# Patient Record
Sex: MALE | Race: WHITE | NOT HISPANIC OR LATINO | URBAN - METROPOLITAN AREA
[De-identification: names, ages, dates, MRNs, and addresses within clinical notes are randomized per-mention and may not be internally consistent; named-entity substitution may affect disease eponyms.]

---

## 2018-07-31 ENCOUNTER — OUTPATIENT (OUTPATIENT)
Dept: OUTPATIENT SERVICES | Facility: HOSPITAL | Age: 83
LOS: 1 days | End: 2018-07-31

## 2020-04-29 ENCOUNTER — OUTPATIENT (OUTPATIENT)
Dept: OUTPATIENT SERVICES | Facility: HOSPITAL | Age: 85
LOS: 1 days | End: 2020-04-29

## 2020-04-29 ENCOUNTER — INPATIENT (INPATIENT)
Facility: HOSPITAL | Age: 85
LOS: 1 days | Discharge: NURSING, REHAB OR LONG-TERM CARE | End: 2020-05-01
Attending: FAMILY MEDICINE
Payer: MEDICARE

## 2020-04-29 PROCEDURE — 99284 EMERGENCY DEPT VISIT MOD MDM: CPT

## 2020-04-29 PROCEDURE — 72146 MRI CHEST SPINE W/O DYE: CPT | Mod: 26

## 2020-04-29 PROCEDURE — 99221 1ST HOSP IP/OBS SF/LOW 40: CPT

## 2020-04-29 PROCEDURE — 72148 MRI LUMBAR SPINE W/O DYE: CPT | Mod: 26

## 2020-04-30 ENCOUNTER — OUTPATIENT (OUTPATIENT)
Dept: OUTPATIENT SERVICES | Facility: HOSPITAL | Age: 85
LOS: 1 days | End: 2020-04-30

## 2020-05-01 ENCOUNTER — OUTPATIENT (OUTPATIENT)
Dept: OUTPATIENT SERVICES | Facility: HOSPITAL | Age: 85
LOS: 1 days | End: 2020-05-01

## 2020-05-01 ENCOUNTER — INPATIENT (INPATIENT)
Facility: HOSPITAL | Age: 85
LOS: 20 days | Discharge: HOME | End: 2020-05-22
Attending: INTERNAL MEDICINE | Admitting: INTERNAL MEDICINE
Payer: MEDICARE

## 2020-05-04 ENCOUNTER — OUTPATIENT (OUTPATIENT)
Dept: OUTPATIENT SERVICES | Facility: HOSPITAL | Age: 85
LOS: 1 days | End: 2020-05-04

## 2020-05-13 ENCOUNTER — OUTPATIENT (OUTPATIENT)
Dept: OUTPATIENT SERVICES | Facility: HOSPITAL | Age: 85
LOS: 1 days | End: 2020-05-13

## 2020-05-14 ENCOUNTER — OUTPATIENT (OUTPATIENT)
Dept: OUTPATIENT SERVICES | Facility: HOSPITAL | Age: 85
LOS: 1 days | End: 2020-05-14

## 2020-05-14 PROCEDURE — 71045 X-RAY EXAM CHEST 1 VIEW: CPT | Mod: 26

## 2020-05-19 ENCOUNTER — OUTPATIENT (OUTPATIENT)
Dept: OUTPATIENT SERVICES | Facility: HOSPITAL | Age: 85
LOS: 1 days | End: 2020-05-19

## 2020-05-20 ENCOUNTER — OUTPATIENT (OUTPATIENT)
Dept: OUTPATIENT SERVICES | Facility: HOSPITAL | Age: 85
LOS: 1 days | End: 2020-05-20

## 2020-05-21 ENCOUNTER — OUTPATIENT (OUTPATIENT)
Dept: OUTPATIENT SERVICES | Facility: HOSPITAL | Age: 85
LOS: 1 days | End: 2020-05-21

## 2020-05-27 PROBLEM — Z00.00 ENCOUNTER FOR PREVENTIVE HEALTH EXAMINATION: Status: ACTIVE | Noted: 2020-05-27

## 2020-06-04 ENCOUNTER — APPOINTMENT (OUTPATIENT)
Dept: NEUROSURGERY | Facility: CLINIC | Age: 85
End: 2020-06-04
Payer: MEDICARE

## 2020-06-04 VITALS
WEIGHT: 175 LBS | HEIGHT: 71 IN | SYSTOLIC BLOOD PRESSURE: 128 MMHG | DIASTOLIC BLOOD PRESSURE: 68 MMHG | HEART RATE: 70 BPM | BODY MASS INDEX: 24.5 KG/M2

## 2020-06-04 DIAGNOSIS — Z80.1 FAMILY HISTORY OF MALIGNANT NEOPLASM OF TRACHEA, BRONCHUS AND LUNG: ICD-10-CM

## 2020-06-04 DIAGNOSIS — I10 ESSENTIAL (PRIMARY) HYPERTENSION: ICD-10-CM

## 2020-06-04 PROCEDURE — 99215 OFFICE O/P EST HI 40 MIN: CPT

## 2020-06-04 RX ORDER — OXYCODONE HYDROCHLORIDE 5 MG/1
5 CAPSULE ORAL EVERY 6 HOURS
Qty: 40 | Refills: 0 | Status: ACTIVE | COMMUNITY
Start: 2020-06-04 | End: 1900-01-01

## 2020-06-04 RX ORDER — ENOXAPARIN SODIUM 40 MG/.4ML
40 INJECTION SUBCUTANEOUS
Refills: 0 | Status: ACTIVE | COMMUNITY

## 2020-06-04 RX ORDER — POLYETHYLENE GLYCOL 3350
POWDER (GRAM) MISCELLANEOUS
Refills: 0 | Status: ACTIVE | COMMUNITY

## 2020-06-04 RX ORDER — 0.9 % SODIUM CHLORIDE 0.9 %
0.9 INTRAVENOUS SOLUTION INTRAVENOUS
Refills: 0 | Status: ACTIVE | COMMUNITY

## 2020-06-04 RX ORDER — LEVOBUNOLOL HYDROCHLORIDE 5 MG/ML
0.5 SOLUTION/ DROPS OPHTHALMIC
Refills: 0 | Status: ACTIVE | COMMUNITY

## 2020-06-04 RX ORDER — LATANOPROST/PF 0.005 %
0.01 DROPS OPHTHALMIC (EYE)
Refills: 0 | Status: ACTIVE | COMMUNITY

## 2020-06-04 RX ORDER — FAMOTIDINE 20 MG/1
20 TABLET, FILM COATED ORAL
Refills: 0 | Status: ACTIVE | COMMUNITY

## 2020-06-04 RX ORDER — CELECOXIB 200 MG/1
200 CAPSULE ORAL
Qty: 30 | Refills: 2 | Status: ACTIVE | COMMUNITY
Start: 2020-06-04 | End: 1900-01-01

## 2020-06-04 RX ORDER — TRAMADOL HYDROCHLORIDE 25 MG/1
TABLET, COATED ORAL
Refills: 0 | Status: ACTIVE | COMMUNITY

## 2020-06-04 RX ORDER — OXYCODONE HYDROCHLORIDE 5 MG/1
5 CAPSULE ORAL
Refills: 0 | Status: ACTIVE | COMMUNITY

## 2020-06-04 NOTE — CONSULT LETTER
[Dear  ___] : Dear  [unfilled], [Consult Letter:] : I had the pleasure of evaluating your patient, [unfilled]. [Consult Closing:] : Thank you very much for allowing me to participate in the care of this patient.  If you have any questions, please do not hesitate to contact me. [Sincerely,] : Sincerely, [FreeTextEntry1] : \par Mr. Velasquez is a pleasant 86 year old male who presents to the office in follow-up after he was admitted to [facility] about two months ago with gait difficulty and lower extremity weakness. He has been experiencing progressive weakness and numbness over the past three months. He is unable to walk unassisted and gets around primarily with a wheelchair. He had physical therapy at [facility] without any relief. He has been also taking Tramadol and Tylenol for pain with minimal relief. An MRI performed at [facility] reveals severe central canal stenosis most notable at L2-3 and L3-4, less so at L4-5. 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 CONSERVATIVE METHODS\par \par This is a patient with severe bilateral facet arthropathy and bulging discs at L2-3, L3-4 and L4-5 resulting in severe central canal stenosis causing symptoms suggestive of neurogenic claudication. He has undergone conservative management with no relief. He is barely ambulatory at this point even with assistance and had no improvement despite a month in rehab. At this point his pain/numbness and weakness is refractory to the conservative methods and he is a good candidate for a decompressive laminectomy with possible fusion. This entails removing the lamina and possibly removing the medial facet joints along with the underlying hypertrophied ligamentum flavum.  This will allow for a widening of the spinal canal and the neuroforamen at the effected levels.  In doing so may create instability to the spine (at this level) requiring the need for instrumented stabilization and fusion.  This implies the use of pedicle screws and possibly interbody prosthetics to provide strength and anatomical alignment to the operated segments.  \par  \par Risks and benefits of surgery were described to the patient in detail which include but not excluding those otherwise not mentioned:  coma paralysis, death, stroke, spinal fluid leak, nerve injury, weakness, infection, hardware malfunction/failure/malpositioning requiring re-operation, vascular injury, nonunion/pseudoarthrosis, adjacent segment degeneration, dysphonia/dysphagia and persistent pain. [FreeTextEntry3] : Akanksha Orlando [employer]

## 2020-06-04 NOTE — RESULTS/DATA
[FreeTextEntry1] : MRI ([facility] [place]) l-spine was personally reviewed with the patient in the office today. At L2-3, L3-4 there is a broad based disc bulge with severe bilateral facet arthropathy resulting in severe central canal stenosis. At L4-5 there is a posterior disc bulge and facet arthropathy with ligamentum flavum hypertrophy resulting in moderate to severe central canal stenosis. There is severe left and moderate right foraminal stenosis at that level.

## 2020-06-04 NOTE — PHYSICAL EXAM
[General Appearance - Alert] : alert [General Appearance - Well Developed] : well developed [General Appearance - Well Nourished] : well nourished [General Appearance - In No Acute Distress] : in no acute distress [] : normal voice and communication [General Appearance - Well-Appearing] : healthy appearing [Place] : oriented to place [Person] : oriented to person [Time] : oriented to time [Involuntary Movements] : no involuntary movements were seen [Motor Tone] : muscle tone was normal in all four extremities [No Muscle Atrophy] : normal bulk in all four extremities [Motor Handedness Right-Handed] : the patient is right hand dominant [Limited Balance] : the patient's balance was impaired [2+] : Patella right 2+ [1+] : Patella left 1+ [Antalgic] : antalgic [4] : 4/5 Great Toe Extension (L5) [5] : 5/5 Ankle Plantar Flexion (S1) [Intact] : all sensory within normal limits bilaterally [FreeTextEntry1] : minimally ambulatory [Able to toe walk] : the patient was not able to toe walk [Able to heel walk] : the patient was not able to heel walk

## 2020-06-04 NOTE — HISTORY OF PRESENT ILLNESS
[FreeTextEntry1] : Mr. Velasquez is a pleasant 86 year old male who presents to the office in follow-up after he was admitted to [facility] [place] about two months ago with gait difficulty and lower extremity weakness. He has been experiencing progressive weakness and numbness over the past three months. He is unable to walk unassisted and gets around primarily with a wheelchair. He had physical therapy at [facility] [place] [facility] without any relief. He has been also taking Tramadol and Tylenol for pain with minimal relief. An MRI performed at [facility] [place] [facility] reveals severe central canal stenosis most notable at L2-3 and L3-4, less so at L4-5.

## 2020-06-04 NOTE — REASON FOR VISIT
[Follow-Up: _____] : a [unfilled] follow-up visit [Spouse] : spouse [FreeTextEntry1] : Lower back pain with radiating leg pain. Patient had imaging done at [facility].

## 2020-06-04 NOTE — REVIEW OF SYSTEMS
[Numbness] : numbness [Tingling] : tingling [Abnormal Sensation] : an abnormal sensation [Negative] : Constitutional

## 2020-06-04 NOTE — ASSESSMENT
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 CONSERVATIVE METHODS\par \par This is a patient with severe bilateral facet arthropathy and bulging discs at L2-3, L3-4 and L4-5 resulting in severe central canal stenosis causing symptoms suggestive of neurogenic claudication. He has undergone conservative management with no relief. He is barely ambulatory at this point even with assistance and had no improvement despite a month in rehab. At this point his pain/numbness and weakness is refractory to the conservative methods and he is a good candidate for a decompressive laminectomy with possible fusion. This entails removing the lamina and possibly removing the medial facet joints along with the underlying hypertrophied ligamentum flavum.  This will allow for a widening of the spinal canal and the neuroforamen at the effected levels.  In doing so may create instability to the spine (at this level) requiring the need for instrumented stabilization and fusion.  This implies the use of pedicle screws and possibly interbody prosthetics to provide strength and anatomical alignment to the operated segments.  \par  \par Risks and benefits of surgery were described to the patient in detail which include but not excluding those otherwise not mentioned:  coma paralysis, death, stroke, spinal fluid leak, nerve injury, weakness, infection, hardware malfunction/failure/malpositioning requiring re-operation, vascular injury, nonunion/pseudoarthrosis, adjacent segment degeneration, dysphonia/dysphagia and persistent pain.

## 2020-06-08 ENCOUNTER — INPATIENT (INPATIENT)
Facility: HOSPITAL | Age: 85
LOS: 3 days | Discharge: NURSING, REHAB OR LONG-TERM CARE | End: 2020-06-12
Attending: NEUROLOGICAL SURGERY | Admitting: NEUROLOGICAL SURGERY
Payer: MEDICARE

## 2020-06-08 ENCOUNTER — OUTPATIENT (OUTPATIENT)
Dept: OUTPATIENT SERVICES | Facility: HOSPITAL | Age: 85
LOS: 1 days | End: 2020-06-08

## 2020-06-08 PROCEDURE — 99285 EMERGENCY DEPT VISIT HI MDM: CPT

## 2020-06-08 PROCEDURE — 71045 X-RAY EXAM CHEST 1 VIEW: CPT | Mod: 26

## 2020-06-10 ENCOUNTER — OUTPATIENT (OUTPATIENT)
Dept: OUTPATIENT SERVICES | Facility: HOSPITAL | Age: 85
LOS: 1 days | End: 2020-06-10

## 2020-06-10 PROCEDURE — 63047 LAM FACETEC & FORAMOT LUMBAR: CPT

## 2020-06-10 PROCEDURE — 63047 LAM FACETEC & FORAMOT LUMBAR: CPT | Mod: AS

## 2020-06-10 PROCEDURE — 63048 LAM FACETEC &FORAMOT EA ADDL: CPT

## 2020-06-10 PROCEDURE — 63048 LAM FACETEC &FORAMOT EA ADDL: CPT | Mod: AS

## 2020-06-11 ENCOUNTER — OUTPATIENT (OUTPATIENT)
Dept: OUTPATIENT SERVICES | Facility: HOSPITAL | Age: 85
LOS: 1 days | End: 2020-06-11

## 2020-06-12 ENCOUNTER — OUTPATIENT (OUTPATIENT)
Dept: OUTPATIENT SERVICES | Facility: HOSPITAL | Age: 85
LOS: 1 days | End: 2020-06-12

## 2020-06-12 ENCOUNTER — INPATIENT (INPATIENT)
Facility: HOSPITAL | Age: 85
LOS: 20 days | Discharge: HOME | End: 2020-07-03
Attending: INTERNAL MEDICINE | Admitting: INTERNAL MEDICINE

## 2020-06-16 ENCOUNTER — OUTPATIENT (OUTPATIENT)
Dept: OUTPATIENT SERVICES | Facility: HOSPITAL | Age: 85
LOS: 1 days | End: 2020-06-16

## 2020-06-22 ENCOUNTER — OUTPATIENT (OUTPATIENT)
Dept: OUTPATIENT SERVICES | Facility: HOSPITAL | Age: 85
LOS: 1 days | End: 2020-06-22

## 2020-07-02 ENCOUNTER — OUTPATIENT (OUTPATIENT)
Dept: OUTPATIENT SERVICES | Facility: HOSPITAL | Age: 85
LOS: 1 days | End: 2020-07-02

## 2020-07-15 ENCOUNTER — APPOINTMENT (OUTPATIENT)
Dept: NEUROSURGERY | Facility: CLINIC | Age: 85
End: 2020-07-15
Payer: MEDICARE

## 2020-07-15 VITALS
DIASTOLIC BLOOD PRESSURE: 75 MMHG | HEIGHT: 71 IN | WEIGHT: 170 LBS | HEART RATE: 75 BPM | SYSTOLIC BLOOD PRESSURE: 136 MMHG | BODY MASS INDEX: 23.8 KG/M2

## 2020-07-15 PROCEDURE — 99024 POSTOP FOLLOW-UP VISIT: CPT

## 2020-07-15 NOTE — PHYSICAL EXAM
[General Appearance - Alert] : alert [General Appearance - Well Nourished] : well nourished [General Appearance - In No Acute Distress] : in no acute distress [General Appearance - Well Developed] : well developed [] : normal voice and communication [] :  [Longitudinal] : longitudinal [Upper Portion] : upper portion [Person] : oriented to person [Place] : oriented to place [Time] : oriented to time [Motor Tone] : muscle tone was normal in all four extremities [Involuntary Movements] : no involuntary movements were seen [No Muscle Atrophy] : normal bulk in all four extremities [Motor Handedness Right-Handed] : the patient is right hand dominant [4] : S1 flexor hallucis longus 4/5 [5] : S1 flexor hallucis longus 5/5 [Limited Balance] : the patient's balance was impaired [FreeTextEntry1] : mild dehiscence with granulation tissue [FreeTextEntry8] : minimally ambulatory

## 2020-07-15 NOTE — REASON FOR VISIT
[de-identified] : Decompressive Laminectomy L2-5 with decompression  [de-identified] : 6/10/2020 [de-identified] : Patient is here one month post op= he is having some neck stiffness and lower back pain. says its surgical site pain. He is tired and has some weakness, States his R-leg drags but is better then before surgery. Pt wants to know if he should still be doing.

## 2020-07-15 NOTE — ASSESSMENT
[FreeTextEntry1] : Adolfo is 5 weeks status post L2-5 decompressive laminectomy without fusion. He still has pretty significant weakness to his lower extremities bilaterally and will require several months of physical therapy. I have provided him with a script for PT to continue strengthening his legs and work on balance/gait. He can continue with Tylenol as needed for pain. He is to continue changing the bandage and cleaning the wound every other day with betadine. We will have him follow-up in the next 2-3 months. He is agreeable with this plan.

## 2020-07-15 NOTE — HISTORY OF PRESENT ILLNESS
[FreeTextEntry1] : Mr. Velasquez presents to the office today one month status post L2-5 decompressive laminectomy. He was at the SNF receiving rehab for several weeks before being discharged home. He states there is some improvement in his leg weakness, but some days are better than others. He is still ambulating with a walker and does use a wheelchair on most occasions. He reports minimal pain at the surgical site, but is now only taking Tylenol for pain. He finished home PT and looking to start outpatient PT.

## 2020-07-22 ENCOUNTER — OUTPATIENT (OUTPATIENT)
Dept: OUTPATIENT SERVICES | Facility: HOSPITAL | Age: 85
LOS: 1 days | Discharge: HOME | End: 2020-07-22

## 2020-09-28 ENCOUNTER — APPOINTMENT (OUTPATIENT)
Dept: NEUROSURGERY | Facility: CLINIC | Age: 85
End: 2020-09-28
Payer: MEDICARE

## 2020-09-28 VITALS
WEIGHT: 170 LBS | BODY MASS INDEX: 23.8 KG/M2 | SYSTOLIC BLOOD PRESSURE: 127 MMHG | DIASTOLIC BLOOD PRESSURE: 84 MMHG | HEIGHT: 71 IN | HEART RATE: 63 BPM

## 2020-09-28 DIAGNOSIS — Z87.891 PERSONAL HISTORY OF NICOTINE DEPENDENCE: ICD-10-CM

## 2020-09-28 DIAGNOSIS — Z98.890 OTHER SPECIFIED POSTPROCEDURAL STATES: ICD-10-CM

## 2020-09-28 DIAGNOSIS — M48.062 SPINAL STENOSIS, LUMBAR REGION WITH NEUROGENIC CLAUDICATION: ICD-10-CM

## 2020-09-28 PROCEDURE — 99214 OFFICE O/P EST MOD 30 MIN: CPT

## 2020-09-28 NOTE — ASSESSMENT
[FreeTextEntry1] : Discussed the history, physical examination findings, and previous surgery with the patient with all questions answered. Updated physical therapy prescription given. Discussed that the TLSO brace can be used for comfort. Assistive device as needed when ambulating. Continue home exercises and ambulation as tolerated. He will follow up with us on an as-needed basis.

## 2020-09-28 NOTE — PHYSICAL EXAM
[General Appearance - Alert] : alert [General Appearance - In No Acute Distress] : in no acute distress [General Appearance - Well Nourished] : well nourished [General Appearance - Well Developed] : well developed [General Appearance - Well-Appearing] : healthy appearing [] : normal voice and communication [Longitudinal] : longitudinal [Clean] : clean [Dry] : dry [Well-Healed] : well-healed [Intact] : intact [FreeTextEntry1] : lumbar spine [Oriented To Time, Place, And Person] : oriented to person, place, and time [Impaired Insight] : insight and judgment were intact [Affect] : the affect was normal [Mood] : the mood was normal [Memory Recent] : recent memory was not impaired [Motor Tone] : muscle tone was normal in all four extremities [Involuntary Movements] : no involuntary movements were seen [No Muscle Atrophy] : normal bulk in all four extremities [FreeTextEntry8] : ambulating with a rolling walker

## 2020-09-28 NOTE — HISTORY OF PRESENT ILLNESS
[FreeTextEntry1] : 86-year-old male presents to the neurosurgery office for postoperative evaluation. The patient underwent elective L2-L5 laminectomy decompression in June of 2020. He is here today with his wife stating that he notes improvement in his preoperative symptoms. He has little to no back pain with improved mobilization. He is going to formal physical therapy 2 days a week and doing home self directed therapy. He ambulates with a rolling walker and has been wearing the TLSO brace as recommended. He has no new complaints at present.